# Patient Record
Sex: MALE | Race: WHITE | Employment: STUDENT | URBAN - METROPOLITAN AREA
[De-identification: names, ages, dates, MRNs, and addresses within clinical notes are randomized per-mention and may not be internally consistent; named-entity substitution may affect disease eponyms.]

---
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
## 2022-06-21 ENCOUNTER — TELEPHONE (OUTPATIENT)
Dept: FAMILY MEDICINE CLINIC | Facility: CLINIC | Age: 22
End: 2022-06-21

## 2022-06-21 ENCOUNTER — OFFICE VISIT (OUTPATIENT)
Dept: FAMILY MEDICINE CLINIC | Facility: CLINIC | Age: 22
End: 2022-06-21

## 2022-06-21 ENCOUNTER — HOSPITAL ENCOUNTER (OUTPATIENT)
Dept: GENERAL RADIOLOGY | Facility: HOSPITAL | Age: 22
Discharge: HOME | End: 2022-06-21
Admitting: NURSE PRACTITIONER

## 2022-06-21 VITALS
SYSTOLIC BLOOD PRESSURE: 115 MMHG | DIASTOLIC BLOOD PRESSURE: 70 MMHG | BODY MASS INDEX: 23.58 KG/M2 | HEIGHT: 69 IN | TEMPERATURE: 97.5 F | OXYGEN SATURATION: 98 % | WEIGHT: 159.2 LBS | HEART RATE: 61 BPM

## 2022-06-21 DIAGNOSIS — R05.9 COUGH: ICD-10-CM

## 2022-06-21 DIAGNOSIS — J20.9 ACUTE BRONCHITIS, UNSPECIFIED ORGANISM: ICD-10-CM

## 2022-06-21 DIAGNOSIS — J20.9 ACUTE BRONCHITIS, UNSPECIFIED ORGANISM: Primary | ICD-10-CM

## 2022-06-21 DIAGNOSIS — R53.83 FATIGUE, UNSPECIFIED TYPE: ICD-10-CM

## 2022-06-21 PROCEDURE — 71046 X-RAY EXAM CHEST 2 VIEWS: CPT

## 2022-06-21 PROCEDURE — 99204 OFFICE O/P NEW MOD 45 MIN: CPT | Performed by: NURSE PRACTITIONER

## 2022-06-21 RX ORDER — PREDNISONE 20 MG/1
40 TABLET ORAL DAILY
Qty: 14 TABLET | Refills: 0 | Status: SHIPPED | OUTPATIENT
Start: 2022-06-21

## 2022-06-21 RX ORDER — BROMPHENIRAMINE MALEATE, PSEUDOEPHEDRINE HYDROCHLORIDE, AND DEXTROMETHORPHAN HYDROBROMIDE 2; 30; 10 MG/5ML; MG/5ML; MG/5ML
5 SYRUP ORAL 4 TIMES DAILY PRN
Qty: 120 ML | Refills: 1 | Status: SHIPPED | OUTPATIENT
Start: 2022-06-21

## 2022-06-21 RX ORDER — GUAIFENESIN 600 MG/1
1200 TABLET, EXTENDED RELEASE ORAL DAILY PRN
COMMUNITY

## 2022-06-21 NOTE — TELEPHONE ENCOUNTER
"Attempted to contact patient to inform of result notes as ordered by provider and as listed below. No answer; Left voicemail for patient to return call with office number given.   Hub: ok to relay the following message to patient and document, Thanks.  \"The chest x-ray showed no pneumonia or any significant abnormality at this time.  Does have him continue medication as we discussed and we will see what the labs reveal.\"    "

## 2022-06-21 NOTE — PROGRESS NOTES
Chief Complaint   Patient presents with   • Establish Care     New patient    • Hospital Follow Up Visit     Follow up for Pneumonia        Subjective   Mayank Nunez is a 21 y.o. male    History of Present Illness  Patient in today to establish care.  He was seen at urgent treatment on 6/13/2022 and at that time was diagnosed with an atypical pneumonia.  He was given a Z-Homer and some prednisone.  He states now he has been sick for about 3-1/2 weeks with chest tightness congestion a dry cough.  The medications he took did help loosen up some and Mucinex that he has been taken over-the-counter has helped some.  He denies any fevers or chills and denies any history of allergic rhinitis.    No Known Allergies  History reviewed. No pertinent past medical history.   History reviewed. No pertinent surgical history.  Social History     Socioeconomic History   • Marital status: Single   Tobacco Use   • Smoking status: Never Smoker   • Smokeless tobacco: Never Used   Vaping Use   • Vaping Use: Never used   Substance and Sexual Activity   • Drug use: Defer   • Sexual activity: Defer        Current Outpatient Medications:   •  guaiFENesin (MUCINEX) 600 MG 12 hr tablet, Take 1,200 mg by mouth Daily As Needed for Cough., Disp: , Rfl:   •  brompheniramine-pseudoephedrine-DM 30-2-10 MG/5ML syrup, Take 5 mL by mouth 4 (Four) Times a Day As Needed for Cough., Disp: 120 mL, Rfl: 1  •  predniSONE (DELTASONE) 20 MG tablet, Take 2 tablets by mouth Daily., Disp: 14 tablet, Rfl: 0     Review of Systems   Constitutional: Negative for appetite change, chills, diaphoresis, fatigue, fever and unexpected weight change.   HENT: Negative for congestion, ear pain, hearing loss, rhinorrhea, sinus pressure and sore throat.    Eyes: Negative for itching and visual disturbance.   Respiratory: Positive for cough, chest tightness and shortness of breath. Negative for wheezing.    Cardiovascular: Negative for chest pain, palpitations and leg swelling.    Gastrointestinal: Negative for abdominal pain, blood in stool, constipation, diarrhea, nausea and vomiting.   Endocrine: Negative for cold intolerance, heat intolerance, polydipsia, polyphagia and polyuria.   Genitourinary: Negative for difficulty urinating, dysuria and hematuria.   Musculoskeletal: Negative for arthralgias, back pain, joint swelling, myalgias and neck pain.   Skin: Negative for color change, rash and wound.   Allergic/Immunologic: Negative for environmental allergies and food allergies.   Neurological: Negative for dizziness, weakness, light-headedness, numbness and headaches.   Hematological: Negative for adenopathy. Does not bruise/bleed easily.   Psychiatric/Behavioral: Negative for dysphoric mood and sleep disturbance. The patient is not nervous/anxious.        Objective     Vitals:    06/21/22 0811   BP: 115/70   Pulse: 61   Temp: 97.5 °F (36.4 °C)   SpO2: 98%         06/21/22 0811   Weight: 72.2 kg (159 lb 3.2 oz)     Body mass index is 23.5 kg/m².  No results found for this or any previous visit.    Physical Exam  Vitals reviewed.   Constitutional:       Appearance: He is well-developed.   HENT:      Head: Normocephalic and atraumatic.      Right Ear: There is impacted cerumen.      Left Ear: There is impacted cerumen.      Nose:      Right Sinus: No maxillary sinus tenderness or frontal sinus tenderness.      Left Sinus: No maxillary sinus tenderness or frontal sinus tenderness.      Mouth/Throat:      Pharynx: Posterior oropharyngeal erythema present.   Cardiovascular:      Rate and Rhythm: Normal rate and regular rhythm.      Heart sounds: Normal heart sounds.   Pulmonary:      Effort: Pulmonary effort is normal.      Breath sounds: Normal breath sounds.   Skin:     General: Skin is warm and dry.   Neurological:      Mental Status: He is alert and oriented to person, place, and time.   Psychiatric:         Behavior: Behavior normal.         Assessment & Plan   Problems Addressed this  Visit    None     Visit Diagnoses     Acute bronchitis, unspecified organism    -  Primary    Relevant Medications    guaiFENesin (MUCINEX) 600 MG 12 hr tablet    brompheniramine-pseudoephedrine-DM 30-2-10 MG/5ML syrup    predniSONE (DELTASONE) 20 MG tablet    Other Relevant Orders    XR Chest PA & Lateral    CBC & Differential    Comprehensive Metabolic Panel    TSH Rfx On Abnormal To Free T4    Mononucleosis Screen    Fatigue, unspecified type        Relevant Orders    CBC & Differential    Comprehensive Metabolic Panel    TSH Rfx On Abnormal To Free T4    Mononucleosis Screen    Cough        Relevant Orders    XR Chest PA & Lateral    CBC & Differential    Comprehensive Metabolic Panel    TSH Rfx On Abnormal To Free T4    Mononucleosis Screen      Diagnoses       Codes Comments    Acute bronchitis, unspecified organism    -  Primary ICD-10-CM: J20.9  ICD-9-CM: 466.0     Fatigue, unspecified type     ICD-10-CM: R53.83  ICD-9-CM: 780.79     Cough     ICD-10-CM: R05.9  ICD-9-CM: 786.2       For his current symptoms were going to obtain a chest x-ray, some different labs put him on some Bromfed-DM, another burst of prednisone and some I recommend he continue Mucinex.  Depending on what the chest x-ray shows we will probably go put him on another antibiotic. Patient was encouraged to keep me informed of any acute changes, lack of improvement, or any new concerning symptoms.  If patient becomes concerned, or has any worsening symptoms, they are to report either here, urgent treatment, or emergency room. Plan of care discussed with pt. They verbalized understanding and agreement.     Time spent on visit, including counseling, education, reviewing the chart, and any recent test results, was   19     Minutes    Return visit in 1 week    Counseling provided on bronchitis.    Russ Meadows, APRN   6/21/2022   08:40 EDT     Please note that portions of this document were completed with a voice recognition program.

## 2022-06-21 NOTE — PROGRESS NOTES
The chest x-ray showed no pneumonia or any significant abnormality at this time.  Does have him continue medication as we discussed and we will see what the labs reveal.